# Patient Record
Sex: FEMALE | Race: WHITE | NOT HISPANIC OR LATINO | Employment: FULL TIME | URBAN - METROPOLITAN AREA
[De-identification: names, ages, dates, MRNs, and addresses within clinical notes are randomized per-mention and may not be internally consistent; named-entity substitution may affect disease eponyms.]

---

## 2023-03-13 RX ORDER — PHENTERMINE HYDROCHLORIDE 37.5 MG/1
37.5 TABLET ORAL DAILY
COMMUNITY

## 2023-03-13 RX ORDER — MULTIPLE VITAMINS W/ MINERALS TAB 9MG-400MCG
1 TAB ORAL DAILY
COMMUNITY

## 2023-03-13 RX ORDER — LEVOTHYROXINE SODIUM 150 UG/1
150 TABLET ORAL DAILY
COMMUNITY

## 2023-03-13 RX ORDER — CEPHALEXIN 500 MG/1
500 CAPSULE ORAL 3 TIMES DAILY
COMMUNITY
End: 2023-03-14

## 2023-03-13 RX ORDER — ERGOCALCIFEROL 1.25 MG/1
50000 CAPSULE, LIQUID FILLED ORAL WEEKLY
COMMUNITY

## 2023-03-14 ENCOUNTER — ANESTHESIA EVENT (OUTPATIENT)
Dept: SURGERY | Facility: CLINIC | Age: 53
End: 2023-03-14
Payer: COMMERCIAL

## 2023-03-14 RX ORDER — ACETAMINOPHEN 325 MG/1
325 TABLET ORAL DAILY PRN
COMMUNITY

## 2023-03-14 NOTE — PROGRESS NOTES
PTA medications updated by Medication Scribe prior to surgery via phone call with patient (last doses completed by Nurse)     Medication history sources: Patient and H&P  In the past week, patient estimated taking medication this percent of the time: Greater than 90%  Adherence assessment: N/A Not Observed    Significant changes made to the medication list:  None      Additional medication history information:   None    Medication reconciliation completed by provider prior to medication history? No    Time spent in this activity: 30 MINUTES    The information provided in this note is only as accurate as the sources available at the time of update(s)      Prior to Admission medications    Medication Sig Last Dose Taking? Auth Provider Long Term End Date   acetaminophen (TYLENOL) 325 MG tablet Take 325 mg by mouth daily as needed for mild pain  at PRN Yes Reported, Patient     levothyroxine (SYNTHROID/LEVOTHROID) 150 MCG tablet Take 150 mcg by mouth daily  at PM Yes Reported, Patient Yes    multivitamin w/minerals (THERA-VIT-M) tablet Take 1 tablet by mouth daily  at PM Yes Reported, Patient     OVER-THE-COUNTER Take 1 tablet by mouth daily as needed (SWELLING) PRODUCT NAME: Arnica Tablets  at PM Yes Reported, Patient     phentermine (ADIPEX-P) 37.5 MG tablet Take 37.5 mg by mouth daily  at PM Yes Reported, Patient     vitamin B-12 (CYANOCOBALAMIN) 1000 MCG tablet Take 1,000 mcg by mouth daily  Yes Reported, Patient     vitamin D2 (ERGOCALCIFEROL) 94500 units (1250 mcg) capsule Take 50,000 Units by mouth once a week on Thursday  at PM Yes Reported, Patient

## 2023-03-15 ENCOUNTER — HOSPITAL ENCOUNTER (INPATIENT)
Facility: CLINIC | Age: 53
LOS: 1 days | Discharge: HOME | End: 2023-03-16
Attending: DENTIST | Admitting: DENTIST
Payer: COMMERCIAL

## 2023-03-15 ENCOUNTER — ANESTHESIA (OUTPATIENT)
Dept: SURGERY | Facility: CLINIC | Age: 53
End: 2023-03-15
Payer: COMMERCIAL

## 2023-03-15 PROBLEM — M26.02 MAXILLARY HYPOPLASIA: Status: ACTIVE | Noted: 2023-03-15

## 2023-03-15 PROCEDURE — 250N000011 HC RX IP 250 OP 636: Performed by: NURSE ANESTHETIST, CERTIFIED REGISTERED

## 2023-03-15 PROCEDURE — 360N000077 HC SURGERY LEVEL 4, PER MIN: Performed by: DENTIST

## 2023-03-15 PROCEDURE — 250N000009 HC RX 250: Performed by: NURSE ANESTHETIST, CERTIFIED REGISTERED

## 2023-03-15 PROCEDURE — 999N000141 HC STATISTIC PRE-PROCEDURE NURSING ASSESSMENT: Performed by: DENTIST

## 2023-03-15 PROCEDURE — 250N000025 HC SEVOFLURANE, PER MIN: Performed by: DENTIST

## 2023-03-15 PROCEDURE — 250N000009 HC RX 250: Performed by: DENTIST

## 2023-03-15 PROCEDURE — C1713 ANCHOR/SCREW BN/BN,TIS/BN: HCPCS | Performed by: DENTIST

## 2023-03-15 PROCEDURE — 258N000003 HC RX IP 258 OP 636: Performed by: ANESTHESIOLOGY

## 2023-03-15 PROCEDURE — 0NSR04Z REPOSITION MAXILLA WITH INTERNAL FIXATION DEVICE, OPEN APPROACH: ICD-10-PCS | Performed by: DENTIST

## 2023-03-15 PROCEDURE — 258N000003 HC RX IP 258 OP 636: Performed by: NURSE ANESTHETIST, CERTIFIED REGISTERED

## 2023-03-15 PROCEDURE — 272N000001 HC OR GENERAL SUPPLY STERILE: Performed by: DENTIST

## 2023-03-15 PROCEDURE — 250N000011 HC RX IP 250 OP 636: Performed by: DENTIST

## 2023-03-15 PROCEDURE — 250N000011 HC RX IP 250 OP 636: Performed by: ANESTHESIOLOGY

## 2023-03-15 PROCEDURE — 250N000013 HC RX MED GY IP 250 OP 250 PS 637: Performed by: DENTIST

## 2023-03-15 PROCEDURE — 710N000009 HC RECOVERY PHASE 1, LEVEL 1, PER MIN: Performed by: DENTIST

## 2023-03-15 PROCEDURE — 258N000003 HC RX IP 258 OP 636: Performed by: DENTIST

## 2023-03-15 PROCEDURE — 250N000011 HC RX IP 250 OP 636

## 2023-03-15 PROCEDURE — 250N000009 HC RX 250

## 2023-03-15 PROCEDURE — 370N000017 HC ANESTHESIA TECHNICAL FEE, PER MIN: Performed by: DENTIST

## 2023-03-15 PROCEDURE — 120N000001 HC R&B MED SURG/OB

## 2023-03-15 DEVICE — IMP PLATE STRK ORBITAL 08H 9255758: Type: IMPLANTABLE DEVICE | Site: MAXILLA | Status: FUNCTIONAL

## 2023-03-15 DEVICE — IMPLANTABLE DEVICE: Type: IMPLANTABLE DEVICE | Status: FUNCTIONAL

## 2023-03-15 DEVICE — IMPLANTABLE DEVICE: Type: IMPLANTABLE DEVICE | Site: MAXILLA | Status: FUNCTIONAL

## 2023-03-15 DEVICE — IMP SCR STRK CROSS 1.7X4MM SELF TAP 5017004: Type: IMPLANTABLE DEVICE | Site: MAXILLA | Status: FUNCTIONAL

## 2023-03-15 RX ORDER — FENTANYL CITRATE 50 UG/ML
25 INJECTION, SOLUTION INTRAMUSCULAR; INTRAVENOUS EVERY 5 MIN PRN
Status: DISCONTINUED | OUTPATIENT
Start: 2023-03-15 | End: 2023-03-15 | Stop reason: HOSPADM

## 2023-03-15 RX ORDER — PROPOFOL 10 MG/ML
INJECTION, EMULSION INTRAVENOUS PRN
Status: DISCONTINUED | OUTPATIENT
Start: 2023-03-15 | End: 2023-03-15

## 2023-03-15 RX ORDER — PROCHLORPERAZINE MALEATE 10 MG
10 TABLET ORAL EVERY 6 HOURS PRN
Status: DISCONTINUED | OUTPATIENT
Start: 2023-03-15 | End: 2023-03-16 | Stop reason: HOSPADM

## 2023-03-15 RX ORDER — CHLORHEXIDINE GLUCONATE ORAL RINSE 1.2 MG/ML
10 SOLUTION DENTAL ONCE
Status: COMPLETED | OUTPATIENT
Start: 2023-03-15 | End: 2023-03-15

## 2023-03-15 RX ORDER — ONDANSETRON 2 MG/ML
INJECTION INTRAMUSCULAR; INTRAVENOUS PRN
Status: DISCONTINUED | OUTPATIENT
Start: 2023-03-15 | End: 2023-03-15

## 2023-03-15 RX ORDER — DEXMEDETOMIDINE HYDROCHLORIDE 4 UG/ML
INJECTION, SOLUTION INTRAVENOUS PRN
Status: DISCONTINUED | OUTPATIENT
Start: 2023-03-15 | End: 2023-03-15

## 2023-03-15 RX ORDER — LABETALOL HYDROCHLORIDE 5 MG/ML
5 INJECTION, SOLUTION INTRAVENOUS ONCE
Status: COMPLETED | OUTPATIENT
Start: 2023-03-15 | End: 2023-03-15

## 2023-03-15 RX ORDER — ONDANSETRON 4 MG/1
4 TABLET, ORALLY DISINTEGRATING ORAL EVERY 6 HOURS PRN
Status: DISCONTINUED | OUTPATIENT
Start: 2023-03-15 | End: 2023-03-16 | Stop reason: HOSPADM

## 2023-03-15 RX ORDER — LIDOCAINE 40 MG/G
CREAM TOPICAL
Status: DISCONTINUED | OUTPATIENT
Start: 2023-03-15 | End: 2023-03-16 | Stop reason: HOSPADM

## 2023-03-15 RX ORDER — HYDROMORPHONE HCL IN WATER/PF 6 MG/30 ML
0.2 PATIENT CONTROLLED ANALGESIA SYRINGE INTRAVENOUS
Status: DISCONTINUED | OUTPATIENT
Start: 2023-03-15 | End: 2023-03-16 | Stop reason: HOSPADM

## 2023-03-15 RX ORDER — CEFAZOLIN SODIUM/WATER 2 G/20 ML
2 SYRINGE (ML) INTRAVENOUS
Status: COMPLETED | OUTPATIENT
Start: 2023-03-15 | End: 2023-03-15

## 2023-03-15 RX ORDER — LIDOCAINE HYDROCHLORIDE 20 MG/ML
INJECTION, SOLUTION INFILTRATION; PERINEURAL PRN
Status: DISCONTINUED | OUTPATIENT
Start: 2023-03-15 | End: 2023-03-15

## 2023-03-15 RX ORDER — BENZOCAINE/MENTHOL 6 MG-10 MG
LOZENGE MUCOUS MEMBRANE PRN
Status: DISCONTINUED | OUTPATIENT
Start: 2023-03-15 | End: 2023-03-15 | Stop reason: HOSPADM

## 2023-03-15 RX ORDER — AMOXICILLIN 250 MG
1 CAPSULE ORAL 2 TIMES DAILY
Status: DISCONTINUED | OUTPATIENT
Start: 2023-03-15 | End: 2023-03-16 | Stop reason: HOSPADM

## 2023-03-15 RX ORDER — METHYLPREDNISOLONE SODIUM SUCCINATE 125 MG/2ML
125 INJECTION, POWDER, LYOPHILIZED, FOR SOLUTION INTRAMUSCULAR; INTRAVENOUS EVERY 6 HOURS
Status: COMPLETED | OUTPATIENT
Start: 2023-03-15 | End: 2023-03-16

## 2023-03-15 RX ORDER — ONDANSETRON 4 MG/1
4 TABLET, ORALLY DISINTEGRATING ORAL EVERY 30 MIN PRN
Status: DISCONTINUED | OUTPATIENT
Start: 2023-03-15 | End: 2023-03-15 | Stop reason: HOSPADM

## 2023-03-15 RX ORDER — PROPOFOL 10 MG/ML
INJECTION, EMULSION INTRAVENOUS CONTINUOUS PRN
Status: DISCONTINUED | OUTPATIENT
Start: 2023-03-15 | End: 2023-03-15

## 2023-03-15 RX ORDER — NALOXONE HYDROCHLORIDE 0.4 MG/ML
0.4 INJECTION, SOLUTION INTRAMUSCULAR; INTRAVENOUS; SUBCUTANEOUS
Status: DISCONTINUED | OUTPATIENT
Start: 2023-03-15 | End: 2023-03-16 | Stop reason: HOSPADM

## 2023-03-15 RX ORDER — ACETAMINOPHEN 325 MG/1
650 TABLET ORAL EVERY 4 HOURS PRN
Status: DISCONTINUED | OUTPATIENT
Start: 2023-03-18 | End: 2023-03-16 | Stop reason: HOSPADM

## 2023-03-15 RX ORDER — POLYETHYLENE GLYCOL 3350 17 G/17G
17 POWDER, FOR SOLUTION ORAL DAILY
Status: DISCONTINUED | OUTPATIENT
Start: 2023-03-16 | End: 2023-03-16 | Stop reason: HOSPADM

## 2023-03-15 RX ORDER — OXYMETAZOLINE HYDROCHLORIDE 0.05 G/100ML
SPRAY NASAL PRN
Status: DISCONTINUED | OUTPATIENT
Start: 2023-03-15 | End: 2023-03-15

## 2023-03-15 RX ORDER — FENTANYL CITRATE 50 UG/ML
50 INJECTION, SOLUTION INTRAMUSCULAR; INTRAVENOUS EVERY 5 MIN PRN
Status: DISCONTINUED | OUTPATIENT
Start: 2023-03-15 | End: 2023-03-15 | Stop reason: HOSPADM

## 2023-03-15 RX ORDER — METHYLPREDNISOLONE SODIUM SUCCINATE 125 MG/2ML
125 INJECTION, POWDER, LYOPHILIZED, FOR SOLUTION INTRAMUSCULAR; INTRAVENOUS
Status: COMPLETED | OUTPATIENT
Start: 2023-03-15 | End: 2023-03-15

## 2023-03-15 RX ORDER — HYDROMORPHONE HCL IN WATER/PF 6 MG/30 ML
0.2 PATIENT CONTROLLED ANALGESIA SYRINGE INTRAVENOUS EVERY 5 MIN PRN
Status: DISCONTINUED | OUTPATIENT
Start: 2023-03-15 | End: 2023-03-15 | Stop reason: HOSPADM

## 2023-03-15 RX ORDER — METHYLPREDNISOLONE SODIUM SUCCINATE 125 MG/2ML
125 INJECTION, POWDER, LYOPHILIZED, FOR SOLUTION INTRAMUSCULAR; INTRAVENOUS EVERY 6 HOURS
Status: DISCONTINUED | OUTPATIENT
Start: 2023-03-16 | End: 2023-03-16 | Stop reason: HOSPADM

## 2023-03-15 RX ORDER — AMPICILLIN AND SULBACTAM 1; .5 G/1; G/1
1.5 INJECTION, POWDER, FOR SOLUTION INTRAMUSCULAR; INTRAVENOUS EVERY 6 HOURS
Status: COMPLETED | OUTPATIENT
Start: 2023-03-15 | End: 2023-03-16

## 2023-03-15 RX ORDER — NALOXONE HYDROCHLORIDE 0.4 MG/ML
0.2 INJECTION, SOLUTION INTRAMUSCULAR; INTRAVENOUS; SUBCUTANEOUS
Status: DISCONTINUED | OUTPATIENT
Start: 2023-03-15 | End: 2023-03-16 | Stop reason: HOSPADM

## 2023-03-15 RX ORDER — CHLORHEXIDINE GLUCONATE ORAL RINSE 1.2 MG/ML
SOLUTION DENTAL PRN
Status: DISCONTINUED | OUTPATIENT
Start: 2023-03-15 | End: 2023-03-15 | Stop reason: HOSPADM

## 2023-03-15 RX ORDER — ONDANSETRON 2 MG/ML
4 INJECTION INTRAMUSCULAR; INTRAVENOUS EVERY 6 HOURS PRN
Status: DISCONTINUED | OUTPATIENT
Start: 2023-03-15 | End: 2023-03-16 | Stop reason: HOSPADM

## 2023-03-15 RX ORDER — AMPICILLIN AND SULBACTAM 1; .5 G/1; G/1
1.5 INJECTION, POWDER, FOR SOLUTION INTRAMUSCULAR; INTRAVENOUS EVERY 6 HOURS
Status: DISCONTINUED | OUTPATIENT
Start: 2023-03-15 | End: 2023-03-15

## 2023-03-15 RX ORDER — ACETAMINOPHEN 325 MG/1
975 TABLET ORAL EVERY 8 HOURS
Status: DISCONTINUED | OUTPATIENT
Start: 2023-03-15 | End: 2023-03-16 | Stop reason: HOSPADM

## 2023-03-15 RX ORDER — SODIUM CHLORIDE, SODIUM LACTATE, POTASSIUM CHLORIDE, CALCIUM CHLORIDE 600; 310; 30; 20 MG/100ML; MG/100ML; MG/100ML; MG/100ML
INJECTION, SOLUTION INTRAVENOUS CONTINUOUS
Status: DISCONTINUED | OUTPATIENT
Start: 2023-03-15 | End: 2023-03-15 | Stop reason: HOSPADM

## 2023-03-15 RX ORDER — MAGNESIUM HYDROXIDE 1200 MG/15ML
LIQUID ORAL PRN
Status: DISCONTINUED | OUTPATIENT
Start: 2023-03-15 | End: 2023-03-15 | Stop reason: HOSPADM

## 2023-03-15 RX ORDER — HYDROMORPHONE HCL IN WATER/PF 6 MG/30 ML
0.4 PATIENT CONTROLLED ANALGESIA SYRINGE INTRAVENOUS
Status: DISCONTINUED | OUTPATIENT
Start: 2023-03-15 | End: 2023-03-16 | Stop reason: HOSPADM

## 2023-03-15 RX ORDER — CEFAZOLIN SODIUM/WATER 2 G/20 ML
2 SYRINGE (ML) INTRAVENOUS SEE ADMIN INSTRUCTIONS
Status: DISCONTINUED | OUTPATIENT
Start: 2023-03-15 | End: 2023-03-15 | Stop reason: HOSPADM

## 2023-03-15 RX ORDER — HYDROMORPHONE HCL IN WATER/PF 6 MG/30 ML
0.4 PATIENT CONTROLLED ANALGESIA SYRINGE INTRAVENOUS EVERY 5 MIN PRN
Status: DISCONTINUED | OUTPATIENT
Start: 2023-03-15 | End: 2023-03-15 | Stop reason: HOSPADM

## 2023-03-15 RX ORDER — FENTANYL CITRATE 50 UG/ML
INJECTION, SOLUTION INTRAMUSCULAR; INTRAVENOUS PRN
Status: DISCONTINUED | OUTPATIENT
Start: 2023-03-15 | End: 2023-03-15

## 2023-03-15 RX ORDER — LIDOCAINE 40 MG/G
CREAM TOPICAL
Status: DISCONTINUED | OUTPATIENT
Start: 2023-03-15 | End: 2023-03-15 | Stop reason: HOSPADM

## 2023-03-15 RX ORDER — SODIUM CHLORIDE 9 MG/ML
INJECTION, SOLUTION INTRAVENOUS CONTINUOUS
Status: DISCONTINUED | OUTPATIENT
Start: 2023-03-15 | End: 2023-03-16 | Stop reason: HOSPADM

## 2023-03-15 RX ORDER — BISACODYL 10 MG
10 SUPPOSITORY, RECTAL RECTAL DAILY PRN
Status: DISCONTINUED | OUTPATIENT
Start: 2023-03-15 | End: 2023-03-16 | Stop reason: HOSPADM

## 2023-03-15 RX ORDER — ONDANSETRON 2 MG/ML
4 INJECTION INTRAMUSCULAR; INTRAVENOUS EVERY 30 MIN PRN
Status: DISCONTINUED | OUTPATIENT
Start: 2023-03-15 | End: 2023-03-15 | Stop reason: HOSPADM

## 2023-03-15 RX ADMIN — PHENYLEPHRINE HYDROCHLORIDE 50 MCG: 10 INJECTION INTRAVENOUS at 14:44

## 2023-03-15 RX ADMIN — PROPOFOL 30 MCG/KG/MIN: 10 INJECTION, EMULSION INTRAVENOUS at 13:27

## 2023-03-15 RX ADMIN — FENTANYL CITRATE 25 MCG: 50 INJECTION, SOLUTION INTRAMUSCULAR; INTRAVENOUS at 16:27

## 2023-03-15 RX ADMIN — HYDROMORPHONE HYDROCHLORIDE 0.5 MG: 1 INJECTION, SOLUTION INTRAMUSCULAR; INTRAVENOUS; SUBCUTANEOUS at 16:02

## 2023-03-15 RX ADMIN — PROPOFOL 200 MG: 10 INJECTION, EMULSION INTRAVENOUS at 13:19

## 2023-03-15 RX ADMIN — REMIFENTANIL HYDROCHLORIDE 0.1 MCG/KG/MIN: 1 INJECTION, POWDER, LYOPHILIZED, FOR SOLUTION INTRAVENOUS at 13:27

## 2023-03-15 RX ADMIN — HYDROMORPHONE HYDROCHLORIDE 0.4 MG: 0.2 INJECTION, SOLUTION INTRAMUSCULAR; INTRAVENOUS; SUBCUTANEOUS at 17:07

## 2023-03-15 RX ADMIN — LABETALOL HYDROCHLORIDE 5 MG: 5 INJECTION INTRAVENOUS at 16:25

## 2023-03-15 RX ADMIN — PHENYLEPHRINE HYDROCHLORIDE 50 MCG: 10 INJECTION INTRAVENOUS at 14:55

## 2023-03-15 RX ADMIN — HYDROMORPHONE HYDROCHLORIDE 0.4 MG: 0.2 INJECTION, SOLUTION INTRAMUSCULAR; INTRAVENOUS; SUBCUTANEOUS at 16:58

## 2023-03-15 RX ADMIN — HYDROMORPHONE HYDROCHLORIDE 0.4 MG: 0.2 INJECTION, SOLUTION INTRAMUSCULAR; INTRAVENOUS; SUBCUTANEOUS at 16:33

## 2023-03-15 RX ADMIN — HYDROMORPHONE HYDROCHLORIDE 0.4 MG: 0.2 INJECTION, SOLUTION INTRAMUSCULAR; INTRAVENOUS; SUBCUTANEOUS at 16:45

## 2023-03-15 RX ADMIN — METHYLPREDNISOLONE 125 MG: 125 INJECTION, POWDER, LYOPHILIZED, FOR SOLUTION INTRAMUSCULAR; INTRAVENOUS at 13:49

## 2023-03-15 RX ADMIN — PHENYLEPHRINE HYDROCHLORIDE 50 MCG: 10 INJECTION INTRAVENOUS at 15:04

## 2023-03-15 RX ADMIN — SODIUM CHLORIDE, POTASSIUM CHLORIDE, SODIUM LACTATE AND CALCIUM CHLORIDE: 600; 310; 30; 20 INJECTION, SOLUTION INTRAVENOUS at 15:57

## 2023-03-15 RX ADMIN — ROCURONIUM BROMIDE 50 MG: 50 INJECTION, SOLUTION INTRAVENOUS at 13:19

## 2023-03-15 RX ADMIN — REMIFENTANIL HYDROCHLORIDE 0.15 MCG/KG/MIN: 1 INJECTION, POWDER, LYOPHILIZED, FOR SOLUTION INTRAVENOUS at 13:55

## 2023-03-15 RX ADMIN — AMPICILLIN SODIUM AND SULBACTAM SODIUM 1.5 G: 1; .5 INJECTION, POWDER, FOR SOLUTION INTRAMUSCULAR; INTRAVENOUS at 23:35

## 2023-03-15 RX ADMIN — ACETAMINOPHEN 975 MG: 325 TABLET, FILM COATED ORAL at 20:51

## 2023-03-15 RX ADMIN — ONDANSETRON 4 MG: 2 INJECTION INTRAMUSCULAR; INTRAVENOUS at 17:34

## 2023-03-15 RX ADMIN — MIDAZOLAM 2 MG: 1 INJECTION INTRAMUSCULAR; INTRAVENOUS at 13:14

## 2023-03-15 RX ADMIN — PHENYLEPHRINE HYDROCHLORIDE 100 MCG: 10 INJECTION INTRAVENOUS at 15:11

## 2023-03-15 RX ADMIN — SODIUM CHLORIDE, POTASSIUM CHLORIDE, SODIUM LACTATE AND CALCIUM CHLORIDE: 600; 310; 30; 20 INJECTION, SOLUTION INTRAVENOUS at 13:14

## 2023-03-15 RX ADMIN — OXYMETAZOLINE HYDROCHLORIDE 2 SPRAY: 0.05 SPRAY NASAL at 13:18

## 2023-03-15 RX ADMIN — Medication 3 G: at 13:22

## 2023-03-15 RX ADMIN — SUGAMMADEX 200 MG: 100 INJECTION, SOLUTION INTRAVENOUS at 16:02

## 2023-03-15 RX ADMIN — ONDANSETRON 4 MG: 2 INJECTION INTRAMUSCULAR; INTRAVENOUS at 15:57

## 2023-03-15 RX ADMIN — FENTANYL CITRATE 100 MCG: 50 INJECTION, SOLUTION INTRAMUSCULAR; INTRAVENOUS at 13:19

## 2023-03-15 RX ADMIN — DEXMEDETOMIDINE HYDROCHLORIDE 20 MCG: 200 INJECTION INTRAVENOUS at 13:19

## 2023-03-15 RX ADMIN — FENTANYL CITRATE 25 MCG: 50 INJECTION, SOLUTION INTRAMUSCULAR; INTRAVENOUS at 16:22

## 2023-03-15 RX ADMIN — SODIUM CHLORIDE: 9 INJECTION, SOLUTION INTRAVENOUS at 20:21

## 2023-03-15 RX ADMIN — METHYLPREDNISOLONE SODIUM SUCCINATE 125 MG: 125 INJECTION, POWDER, FOR SOLUTION INTRAMUSCULAR; INTRAVENOUS at 20:51

## 2023-03-15 RX ADMIN — LIDOCAINE HYDROCHLORIDE 80 MG: 20 INJECTION, SOLUTION INFILTRATION; PERINEURAL at 13:19

## 2023-03-15 RX ADMIN — HYDROMORPHONE HYDROCHLORIDE 0.4 MG: 0.2 INJECTION, SOLUTION INTRAMUSCULAR; INTRAVENOUS; SUBCUTANEOUS at 20:17

## 2023-03-15 RX ADMIN — CHLORHEXIDINE GLUCONATE 0.12% ORAL RINSE 10 ML: 1.2 LIQUID ORAL at 11:37

## 2023-03-15 RX ADMIN — PHENYLEPHRINE HYDROCHLORIDE 50 MCG: 10 INJECTION INTRAVENOUS at 14:52

## 2023-03-15 RX ADMIN — PHENYLEPHRINE HYDROCHLORIDE 100 MCG: 10 INJECTION INTRAVENOUS at 15:16

## 2023-03-15 RX ADMIN — ONDANSETRON 4 MG: 2 INJECTION INTRAMUSCULAR; INTRAVENOUS at 19:57

## 2023-03-15 ASSESSMENT — ACTIVITIES OF DAILY LIVING (ADL)
ADLS_ACUITY_SCORE: 20
ADLS_ACUITY_SCORE: 33
ADLS_ACUITY_SCORE: 20

## 2023-03-15 ASSESSMENT — LIFESTYLE VARIABLES: TOBACCO_USE: 0

## 2023-03-15 ASSESSMENT — ENCOUNTER SYMPTOMS: SEIZURES: 0

## 2023-03-15 NOTE — OP NOTE
Procedure Date: 03/15/2023    PREOPERATIVE DIAGNOSIS:  Maxillary hypoplasia.    POSTOPERATIVE DIAGNOSES:  Maxillary hypoplasia.    PROCEDURE PERFORMED:  Segmental [place] osteotomy.    SURGEON:  Audi Baca MD     ANESTHESIA:  General anesthetic with nasotracheal intubation.    ASSISTANT SURGEON:  Dr. Km Alves.      ESTIMATED BLOOD LOSS FOR THE CASE:  100 mL.     REPLACEMENT:  Crystalloid.    HISTORY OF PRESENT ILLNESS:  The patient is a 52-year-old woman well known to our office, whose care is coordinated through her orthodontist, Dr. Nicho Hidalgo.  He has performed leveling and aligning of the dentition with full orthodontic appliances in preparation for a 2-piece maxilla or segmental maxillary osteotomy.  She has a longstanding history of class 3 malocclusion with maxillary hypoplasia.  The maxillary hypoplasia is most severe in the transverse dimension.  Physical examination reveals full orthodontic appliances with good hygiene.  She demonstrates bilateral crossbite.  She has end to end occlusal relationship.  Virtual surgical planning was performed to treatment plan and fabricate acrylic CAD/CAM splints.  These were tried in preoperatively and noted to fit well with the mandibular dentition.  Her past medical history is significant for prior gastric bypass as well as reduction mammoplasty.  She has a history of hypothyroidism for which she takes levothyroxine supplement.  The risks and complications of segmental [place] osteotomy were discussed at length with the patient on multiple occasions.  As well, her son recently underwent a similar surgery and therefore she is quite fluent in the anatomy, postoperative cares, risks, et cetera.  Potential for postoperative relapse pain, swelling, infection, bleeding sinusitis, nasal septal deviation, et cetera has been reviewed.  Postoperative cares including no chew diet x 2 months, wired splint to the teeth requiring extra cleaning precautions has all been  reviewed.  All questions answered.  Consent is signed.    DESCRIPTION OF PROCEDURE:  The patient was taken to the main OR, placed supine on the operating room table and after smooth induction of general anesthetic, nasotracheal intubation was achieved.  The tube was secured by the anesthesia service.  The patient was prepped and draped in the usual fashion for the procedure.  Moistened throat pack was placed.  10 mL of 2% lidocaine was utilized to infiltrate the maxillary vestibule.  A 15 blade and electrocautery were utilized to create a [place] I incision through mucosa and submucosa.  The dissection was carried through periosteum to expose and skeletonize the anterior maxilla.  The dissection was carried posteriorly to the pterygomaxillary fissures bilaterally.  The dissection was carried into the floor of the nose where the anterior nasal spine is exposed and the nasal mucosa is elevated from the lateral as well as the nasal floor.  A sagittal saw was utilized to create a [place] osteotomy from the right and left piriform rims to the pterygomaxillary fissure bilaterally.  A wax spatula osteotome was utilized to osteotomize the lateral nasal walls.  A nasal septal osteotome was utilized to osteotomize the nasal septum and vomer bone.  Pterygomaxillary dysjunction is created utilizing a curved chisel and mallet.  Digital palpation was utilized during the course of this separation.  A 24-gauge stainless steel wire was passed through the anterior nasal spine utilizing a wire passing bur.  This was rosetted and utilized to down fracture the maxilla in an uncomplicated fashion.  The descending neurovascular bundle was identified, retracted bilaterally.  It was cauterized bilaterally.  Sequential reduction of the posterior maxillary interferences is completed utilizing a long guard barrel bur and irrigation.  Sequential reduction was completed in a meticulous fashion.  Cauterization of the nasal and incisive canal  contents is performed.  Good hemostasis was noted.  Attention is turned to segmentalization, which is initiated with a 700 campos creating cortical perforations between teeth numbers 8 and 9 and carried to the right of the anterior nasal spine.  Rotary instrumentation was then utilized to create and complete the osteotomy through the incisive canal.  Copious irrigation was utilized for this process.  A long guard round bur was then utilized to create a Y-shaped osteotomy through the hard palate from the nasal aspect.  A Turvey distractor was utilized to facilitate increasing of the transverse dimension of the maxilla.  The patient is wired into the prefabricated occlusal splint, which is noted to fit quite well.  The maxilla is wired into the splint utilizing 26-gauge stainless steel wires.  The orthodontic wire is cut in the midline.  A single 26-gauge wire was utilized to approximate the midline by ligating the orthodontic appliances on teeth 8 and 9.  The maxilla was noted to be in a segmental state and to fit passively in the prefabricated splint.  The patient was placed into maxillomandibular fixation utilizing 24 and 26-gauge stainless steel wires.  The maxillary mandibular complex was rotated superiorly and sequential reduction of interferences was completed.  The maxilla was noted to fit quite nicely with adaptation of the piriform rims and maxillary buttresses bilaterally.  Rigid fixation was then utilized to stabilize the segment to the osteotomized maxilla.  [place] 2 mm mini plates are utilized in the piriform rim region as well as the buttress region bilaterally.  A 1.7 mm diameter screws were utilized to stabilize the mini plates.  Excellent stability was achieved.  The maxillomandibular fixation is released and the patient's occlusion was noted to rotate into and out of maximum intercuspation.  After achieving excellent stability, irrigation is applied to the surgical site and attention was turned to  closure of the incision.  This is completed utilizing running 4-0 undyed Vicryl suture.  Tension-free watertight closure was established.  The patient's septum and vomer were noted to be straight and un-impinged.  The previously placed moistened throat pack was removed and the posterior oropharynx is suctioned free and clear secretions.  Orogastric tube is passed for the anesthesia service.  Contents are evacuated.  The patient was placed into light 3/16 medium elastic vertical pull traction in the canine regions bilaterally.  The patient was then returned to the hands of anesthesia for extubation in the main OR following suitable recovery from the general anesthetic.  All sponge and needle counts were correct x 2.    Audi Baca DDS        D: 03/15/2023   T: 03/15/2023   MT: JAY    Name:     TENA SMITHDerek  MRN:      [id number]        Account:        [id number]   :      1970           Procedure Date: 03/15/2023     Document: [id number]

## 2023-03-15 NOTE — ANESTHESIA PREPROCEDURE EVALUATION
Anesthesia Pre-Procedure Evaluation    Patient: Ramona Monroy   MRN: [id number] : 1970        Procedure : Procedure(s):  SEGMENTAL [place] I OSTEOTOMY          Past Medical History:   Diagnosis Date     Epidermolysis bullosa simplex      Hypercholesteremia      Hypertension      Hypothyroidism      Malocclusion, Angle's class III      Maxillary hypoplasia      Morbid obesity (H)      Nocturia      PCOS (polycystic ovarian syndrome)      Sleep apnea      Vitamin D deficiency       Past Surgical History:   Procedure Laterality Date     ARTHROSCOPY KNEE      meniscus repair x 2 (, )      SECTION      x2     CYSTOURETHROSCOPY  2013     LAPAROSCOPIC SINGLE SITE TOTAL HYSTERECTOMY       ZZC SYNVISC-ONE INJECTION Left     knee      Allergies   Allergen Reactions     Ethinyl Estradiol      Other reaction(s): Emotional Disturbance  Has happened with multiple oral contraceptives     Other (Do Not Use)      Other reaction(s): Unknown Reaction  OCP  Intolerance (birth control pills)      Social History     Tobacco Use     Smoking status: Former     Types: Cigarettes     Start date:      Quit date:      Years since quitting: 15.2     Smokeless tobacco: Never   Substance Use Topics     Alcohol use: Yes      Wt Readings from Last 1 Encounters:   03/15/23 129.7 kg (286 lb)        Anesthesia Evaluation   Pt has had prior anesthetic.     No history of anesthetic complications       ROS/MED HX  ENT/Pulmonary:     (+) sleep apnea, doesn't use CPAP,  (-) tobacco use   Neurologic:    (-) no seizures and no CVA   Cardiovascular:     (+) Dyslipidemia hypertension-----    METS/Exercise Tolerance:     Hematologic:       Musculoskeletal:       GI/Hepatic:    (-) GERD and liver disease   Renal/Genitourinary:    (-) renal disease   Endo: Comment: PCOS    (+) thyroid problem, hypothyroidism, Obesity,  (-) Type II DM   Psychiatric/Substance Use:       Infectious Disease:       Malignancy:       Other:             Physical Exam    Airway        Mallampati: III   TM distance: > 3 FB   Neck ROM: full   Mouth opening: > 3 cm    Respiratory Devices and Support         Dental       (+) Modest Abnormalities - crowns, retainers, 1 or 2 missing teeth      Cardiovascular   cardiovascular exam normal          Pulmonary   pulmonary exam normal                OUTSIDE LABS:  CBC: No results found for: WBC, HGB, HCT, PLT  BMP: No results found for: NA, POTASSIUM, CHLORIDE, CO2, BUN, CR, GLC  COAGS: No results found for: PTT, INR, FIBR  POC: No results found for: BGM, HCG, HCGS  HEPATIC: No results found for: ALBUMIN, PROTTOTAL, ALT, AST, GGT, ALKPHOS, BILITOTAL, BILIDIRECT, LEXIS  OTHER: No results found for: PH, LACT, A1C, ROSHAN, PHOS, MAG, LIPASE, AMYLASE, TSH, T4, T3, CRP, SED    Anesthesia Plan    ASA Status:  3   NPO Status:  NPO Appropriate    Anesthesia Type: General.     - Airway: ETT   Induction: Intravenous.   Maintenance: Balanced.   Techniques and Equipment:     - Airway: Video-Laryngoscope, Nasal JONY         Consents    Anesthesia Plan(s) and associated risks, benefits, and realistic alternatives discussed. Questions answered and patient/representative(s) expressed understanding.    - Discussed:     - Discussed with:  Patient, Spouse         Postoperative Care    Pain management: Multi-modal analgesia.   PONV prophylaxis: Ondansetron (or other 5HT-3), Dexamethasone or Solumedrol, Background Propofol Infusion     Comments:                Maxx Kumar MD

## 2023-03-15 NOTE — PROVIDER NOTIFICATION
Dr London aware of /107.  HR: 85.  Verbal order for 5mg Labetalol and to start narcotics for pain control

## 2023-03-15 NOTE — INTERVAL H&P NOTE
"I have reviewed the surgical (or preoperative) H&P that is linked to this encounter, and examined the patient. There are no significant changes    Clinical Conditions Present on Arrival:  Clinically Significant Risk Factors Present on Admission                    # Severe Obesity: Estimated body mass index is 44.79 kg/m  as calculated from the following:    Height as of this encounter: 1.702 m (5' 7\").    Weight as of this encounter: 129.7 kg (286 lb).       "

## 2023-03-15 NOTE — ANESTHESIA POSTPROCEDURE EVALUATION
Patient: Ramona Monroy    Procedure: Procedure(s):  SEGMENTAL [place] I OSTEOTOMY       Anesthesia Type:  General    Note:  Disposition: Admission   Postop Pain Control: Uneventful            Sign Out: Well controlled pain   PONV: No   Neuro/Psych: Uneventful            Sign Out: Acceptable/Baseline neuro status   Airway/Respiratory: Uneventful            Sign Out: Acceptable/Baseline resp. status   CV/Hemodynamics: Uneventful            Sign Out: Acceptable CV status; No obvious hypovolemia; No obvious fluid overload   Other NRE: NONE   DID A NON-ROUTINE EVENT OCCUR? No           Last vitals:  Vitals Value Taken Time   /92 03/15/23 1720   Temp 37.1  C (98.7  F) 03/15/23 1650   Pulse 90 03/15/23 1724   Resp 17 03/15/23 1724   SpO2 92 % 03/15/23 1724   Vitals shown include unvalidated device data.    Electronically Signed By: Nicol London MD  March 15, 2023  5:26 PM

## 2023-03-15 NOTE — BRIEF OP NOTE
[facility]    Brief Operative Note    Pre-operative diagnosis: Maxillary hypoplasia [M26.02]  Malocclusion, Angle's class III [M26.213]  Post-operative diagnosis Same as pre-operative diagnosis    Procedure: Procedure(s):  SEGMENTAL [place] I OSTEOTOMY  Surgeon: Surgeon(s) and Role:     * Audi Baca DDS - Primary     * Moi Alves DDS  Anesthesia: General   Estimated Blood Loss: Less than 100 ml    Drains: None  Specimens: * No specimens in log *  Findings:   None.  Complications: None.  Implants:   Implant Name Type Inv. Item Serial No.  Lot No. LRB No. Used Action   IMP SCR STRK CROSS 1.7X3MM SELF TAP [id number] - [id number] Metallic Hardware/Sandwich IMP SCR STRK CROSS 1.7X3MM SELF TAP [id number]  [employer]  Left 1 Implanted   IMP SCR STRK CROSS 1.7X4MM SELF TAP [id number] - [id number] Metallic Hardware/Sandwich IMP SCR STRK CROSS 1.7X4MM SELF TAP [id number]  [employer]  Left 2 Implanted   IMP SCR STRK CROSS 1.7X4MM SELF TAP [id number] - [id number] Metallic Hardware/Sandwich IMP SCR STRK CROSS 1.7X4MM SELF TAP [id number]  [employer]  Right 2 Implanted   IMP PLATE STRK L SHAPE 05H ADV LOCKING 02MM [id number] - [id number] Metallic Hardware/Sandwich IMP PLATE STRK L SHAPE 05H ADV LOCKING 02MM [id number]  [employer]  Left 1 Implanted   IMP PLATE STRK L SHAPE 05H ADV LOCKING 02MM [id number] - [id number] Metallic Hardware/Sandwich IMP PLATE STRK L SHAPE 05H ADV LOCKING 02MM [id number]  [employer]  Right 1 Implanted   IMP PLATE STRK ORBITAL 08H [id number] - [id number] Metallic Hardware/Sandwich IMP PLATE STRK ORBITAL 08H [id number]  [employer]  N/A 1 Implanted   SELF - DRILLING SCREWS SILVER 1.7MM X 4MM    DANIEL  Right 7 Implanted   SELF DRILLING SCREWS SILVER 1.7MM X 4MM    DANIEL  Left 4 Implanted

## 2023-03-15 NOTE — ANESTHESIA PROCEDURE NOTES
Airway         Procedure Start/Stop Times: 3/15/2023 1:24 PM  Staff -        Anesthesiologist:  Maxx Kumar MD       CRNA: Cheryl Walker APRN CRNA       Other Anesthesia Staff: Danyelle Sidhu       Performed By: SRNAIndications and Patient Condition       Indications for airway management: jessica-procedural       Induction type:intravenous       Mask difficulty assessment: 2 - vent by mask + OA or adjuvant +/- NMBA    Final Airway Details       Final airway type: endotracheal airway       Successful airway: Nasal and JONY  Endotracheal Airway Details        ETT size (mm): 7.0       Cuffed: yes       Cuff volume (mL): 8       Successful intubation technique: video laryngoscopy       VL Blade Size: Glidescope 3       Grade View of Cords: 1       Position: Other (comment)       Measured from: nares (bend at nares)       Bite block used: None    Post intubation assessment        Placement verified by: capnometry, equal breath sounds and chest rise        Number of attempts at approach: 1       Secured with: pink tape       Ease of procedure: easy       Dentition: Intact and Unchanged    Medication(s) Administered   Medication Administration Time: 3/15/2023 1:24 PM    Additional Comments       Nasal ETT secured with taped following intubation. Secured further with tape and sponge with guidance from oral surgeon.

## 2023-03-15 NOTE — ANESTHESIA CARE TRANSFER NOTE
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    Anesthesia Type:   General     Note:    Oropharynx: oropharynx clear of all foreign objects and spontaneously breathing  Level of Consciousness: drowsy  Oxygen Supplementation: face mask  Level of Supplemental Oxygen (L/min / FiO2): 6  Independent Airway: airway patency satisfactory and stable  Dentition: dentition unchanged  Vital Signs Stable: post-procedure vital signs reviewed and stable  Report to RN Given: handoff report given  Patient transferred to: PACU    Handoff Report: Identifed the Patient, Identified the Reponsible Provider, Reviewed the pertinent medical history, Discussed the surgical course, Reviewed Intra-OP anesthesia mangement and issues during anesthesia, Set expectations for post-procedure period and Allowed opportunity for questions and acknowledgement of understanding      Vitals:  Vitals Value Taken Time   /106 03/15/23 1616   Temp     Pulse 95 03/15/23 1617   Resp 19 03/15/23 1617   SpO2 98 % 03/15/23 1617   Vitals shown include unvalidated device data.    Electronically Signed By: LUCIUS Krueger CRNA  March 15, 2023  4:19 PM

## 2023-03-16 VITALS
OXYGEN SATURATION: 93 % | HEIGHT: 67 IN | HEART RATE: 105 BPM | WEIGHT: 286 LBS | BODY MASS INDEX: 44.89 KG/M2 | TEMPERATURE: 97.9 F | RESPIRATION RATE: 16 BRPM | DIASTOLIC BLOOD PRESSURE: 97 MMHG | SYSTOLIC BLOOD PRESSURE: 140 MMHG

## 2023-03-16 PROCEDURE — 250N000013 HC RX MED GY IP 250 OP 250 PS 637: Performed by: DENTIST

## 2023-03-16 PROCEDURE — 250N000011 HC RX IP 250 OP 636: Performed by: DENTIST

## 2023-03-16 RX ORDER — IBUPROFEN 600 MG/1
600 TABLET, FILM COATED ORAL EVERY 6 HOURS PRN
Status: DISCONTINUED | OUTPATIENT
Start: 2023-03-16 | End: 2023-03-16 | Stop reason: HOSPADM

## 2023-03-16 RX ORDER — HYDROCODONE BITARTRATE AND ACETAMINOPHEN 5; 325 MG/1; MG/1
1 TABLET ORAL EVERY 6 HOURS PRN
Status: DISCONTINUED | OUTPATIENT
Start: 2023-03-16 | End: 2023-03-16 | Stop reason: HOSPADM

## 2023-03-16 RX ADMIN — ACETAMINOPHEN 975 MG: 325 TABLET, FILM COATED ORAL at 14:40

## 2023-03-16 RX ADMIN — HYDROCODONE BITARTRATE AND ACETAMINOPHEN 1 TABLET: 5; 325 TABLET ORAL at 10:20

## 2023-03-16 RX ADMIN — ACETAMINOPHEN 975 MG: 325 TABLET, FILM COATED ORAL at 03:43

## 2023-03-16 RX ADMIN — AMPICILLIN SODIUM AND SULBACTAM SODIUM 1.5 G: 1; .5 INJECTION, POWDER, FOR SOLUTION INTRAMUSCULAR; INTRAVENOUS at 11:14

## 2023-03-16 RX ADMIN — AMPICILLIN SODIUM AND SULBACTAM SODIUM 1.5 G: 1; .5 INJECTION, POWDER, FOR SOLUTION INTRAMUSCULAR; INTRAVENOUS at 06:19

## 2023-03-16 RX ADMIN — METHYLPREDNISOLONE SODIUM SUCCINATE 125 MG: 125 INJECTION, POWDER, FOR SOLUTION INTRAMUSCULAR; INTRAVENOUS at 09:00

## 2023-03-16 RX ADMIN — PROCHLORPERAZINE EDISYLATE 10 MG: 5 INJECTION INTRAMUSCULAR; INTRAVENOUS at 00:00

## 2023-03-16 RX ADMIN — METHYLPREDNISOLONE SODIUM SUCCINATE 125 MG: 125 INJECTION, POWDER, FOR SOLUTION INTRAMUSCULAR; INTRAVENOUS at 03:43

## 2023-03-16 ASSESSMENT — ACTIVITIES OF DAILY LIVING (ADL)
ADLS_ACUITY_SCORE: 20

## 2023-03-16 NOTE — CONSULTS
NUTRITION EDUCATION    REASON FOR ASSESSMENT:  Nutrition education on Jaw Surgery Diet    CURRENT DIET:  Full Liquid Jaw Surgery Diet    Visited with pt and  this morning  Pt notes that her son had this surgery back in December, and so she is very familiar with the post-op diet guidelines  Currently is having some nausea  Has taken some broth and sherbet - using a spoon        NUTRITION DIAGNOSIS:  No nutrition diagnosis at this time    INTERVENTIONS:    Nutrition Prescription:  Recommended adequate calories and protein to promote healing, several small meals per day, and use of high protein oral supplements.    Implementation:    Assessed learning needs, learning preferences, and willingness to learn    Nutrition Education  (Content):  a) Provided handout  What to Eat After Jaw Surgery     Medical Food Supplements - recommended use of a high protein nutritional supplement    Anticipate good compliance    Diet Education - refer to Education Flowsheet      Gregoria Naik RD, LD  Clinical Dietitian - [facility]   Pager - [phone]

## 2023-03-16 NOTE — PROVIDER NOTIFICATION
MD Notification    Notified Person: MD    Notified Person Name: Veronica    Notification Date/Time: 3/15/23 2157    Notification Interaction: vocera    Purpose of Notification: pharmacy called me asking about the pts Methyprednisolone. There are 2 orders linked and appear to be exactly the same. Is is possible that the order is meant to gibran? thanks     Orders Received:    Comments:

## 2023-03-16 NOTE — DISCHARGE INSTRUCTIONS
Jaw (Orthognathic) Surgery: Recovering at Home  You just had orthognathic surgery. This is treatment that reshapes the jaws to improve their form and function. Your job now is to keep yourself comfortable and help your body heal quickly. Make sure to get plenty of calories and protein. Get up and move around, but avoid strenuous activity. Be sure to get lots of rest. Keep your mouth and teeth clean to help the cuts (incisions) heal.  Controlling swelling and pain  For the first few days, swelling will likely increase. It should then start to ease. To reduce swelling and pain:  Sit or lie down with your head and shoulders higher than your heart.  Apply an ice pack to your face for 10 minutes at a time, with breaks of at least 5 minutes in between. To make an ice pack, put ice cubes in a plastic bag that seals at the top. Wrap the bag in a clean, thin towel or cloth. Never put an ice pack directly on your skin.  Use pain medicines as directed.  Nutrition and fluids  You will need to get enough nutrition, which may be harder while you re not able to chew. You also need fluids to help prevent dehydration and nausea. For calories, protein, and fluids, try total-nutrition drinks, protein powders, soups, milk shakes, and other blended foods. Don t use a straw, since the suction can stress the incisions in your mouth. Instead, use a glass or a sippy cup designed for young children. Once you can chew again, eat soft foods that can be cut with a spoon or fork. As you heal, you ll slowly return to your normal eating habits.  Keeping your mouth and teeth clean  To keep your teeth as clean as possible:  If you can t open your jaws, brush the front surfaces of your teeth with a baby toothbrush.  If a fluoride toothpaste or mouth rinse is prescribed, use it as directed.  If your surgeon advises it, start using an oral  about 10 days after surgery.  Aim to get back to brushing and flossing normally as soon as you  can.  When to call your surgeon  If you have any of the following problems, call your surgeon:  Severe bleeding  Pain that can t be controlled  Nausea or vomiting that can t be controlled  Swelling that keeps getting worse after 3 to 4 days  Trouble with breathing or swallowing  A fever of 100.4 F  (38 C) or higher  Go to the emergency department or call 911 if your healthcare provider s office is closed.  [employer] last reviewed this educational content on 2/1/2018 2000-2021 [employer]. All rights reserved. This information is not intended as a substitute for professional medical care. Always follow your healthcare professional's instructions.

## 2023-03-16 NOTE — PLAN OF CARE
Goal Outcome Evaluation:      Plan of Care Reviewed With: patient, spouse    Overall Patient Progress: improvingOverall Patient Progress: improving       Pt is AOx4, VSS on RA. POD#1. Up & ambulated in the hallway with Ind. Facial swelling +2 bilaterally. Pain managed with hydroxy & tylenol, and ice applied. Full liquid tolerating. Pt discharge today in the afternoon.

## 2023-03-16 NOTE — PLAN OF CARE
Summary: [id number] 3/15/23 segmental [place] osteotomy   Orientation: A/Ox4    Activity Level: Ax1 to stand by GB  Fall Risk: yes  Behavior & Aggression Tool Color: green  Pain Management: PRN Dilaudid given   ABNL VS/O2: VSS on RA  ABNL Lab/BG: n/a  Diet: full liquid diet jaw surgery  Bowel/Bladder: continent of bladder  Drains/Devices: L PIV infusing 75 mL/hr NaCl  Tests/Procedures for next shift: n/a  Anticipated DC date: TBD  Other Important Info:

## 2023-03-16 NOTE — PLAN OF CARE
Pt is AOx4, pain is managed with current regimen, tolerating full liquid but some nausea, relieved with zofran and compazine, facial swelling +2, small nose bleed at times, suction prn per pt, VSS on face tent with 28% FiO2, voiding, SBA, progressing per plan of care.

## 2023-03-16 NOTE — UTILIZATION REVIEW
Admission Status; Secondary Review Determination       Under the authority of the Utilization Management Committee, the utilization review process indicated a secondary review on the above patient. The review outcome is based on review of the medical records, discussions with staff, and applying clinical experience noted on the date of the review.     (x) Outpatient Status with extended recovery is appropriate - This patient does not meet hospital inpatient criteria. If this patient's primary payer is Medicare and was admitted as an inpatient, Condition Code 44 should be used and patient status changed to outpatient recovery.    RATIONALE FOR DETERMINATION   52 years old wo man who underwent a segmental LE FORT I osteotomy on March 15. Patient was admitted to the hospital after the procedure. The procedure is not on the CMS inpatient list. No documented complications or unexpected recovery. Patient can be safely  monitored for bleeding and recover in outpatient/extended recovery setting. The severity of illness, intensity of service provided, expected LOS and risk for adverse outcome doesn't meet inpatient hospital admission. Dr. Audi Baca and dr Fonseca Office ([phone])  notified.    This document was produced using voice recognition software.     The information on this document is developed by the utilization review team in order for the business office to ensure compliance. This only denotes the appropriateness of proper admission status and does not reflect the quality of care rendered.   The definitions of Inpatient Status and Observation Status used in making the determination above are those provided in the CMS Coverage Manual, Chapter 1 and Chapter 6, section 70.4.   Sincerely,   KARLEE GALAN MD   Utilization Review  Physician Advisor  [facility]

## 2023-03-19 NOTE — DISCHARGE SUMMARY
[facility] INPATIENT DISCHARGE SUMMARY    DATE OF ADMISSION: 3/15/23    DATE OF DISCHARGE: 3/16/23    ADMISSION DIAGNOSES:   1.  Maxillary Hyoplasia    DISCHARGE DIAGNOSES:   1.  Maxillary Hyoplasia    PROCEDURES PERFORMED DURING HOSPITALIZATION:   1.  Segmental LeFort I Osteotomy    SURGEON:    Dr. Audi Baca    SURGEON ASSISTANT:    Dr. Km Alves    HOSPITALIZATION COURSE:  Ramona is a 52 year-old woman presenting with the abovementioned diagnoses.  The patient has been followed closely by the [facility].  After clinical evaluation, evaluation of models and imaging it was recommended that the patient undergo segmental LeFort I osteotomy to help correct her skeletal and dental malocclusion.  After discussion of potential risks and benefits associated with the above-mentioned procedures, the patient consented to proceed.  The patient was concurrently followed by [facility] and their orthodontist prior to surgery.    The patient presented to [facility] on [facility]/15/23 for the abovementioned procedure.  The patient underwent surgery without any complications under general anesthesia. The patient remained stable throught the intraoperative periode and was extubated in the operating room without complications. The patient was admitted postoperatively for routine postoperative observation, pain management, post operative bleeding monitoring and IV antibiotics.  On postop day #1, the patient was found to be recovering well from surgery.  The patient was lying comfortable in bed, alert and oriented.  The patient's pain was controlled overnight.  The patient was able to take adequate PO  liquid diet without nausea or vomiting, and was able void and ambulate without difficulty.   All vital signs remained stable throughout admission.  Given patient's progress, adequate pain control and ability to take PO and void, it was determine that the patient could be discharged to home on POD #1  under the  care of self/family.     DISCHARGE EXAMINATION:   GENERAL:  Patient was alert and oriented. Comfortable.  Answered questions appropriately.  Able to voice concerns appropriately.  Pleasant and cooperative.  Reported that pain was well controlled overnight at 2/10.    NEUROLOGIC:  Cranial nerve II through XII are grossly intact with bilateral cranial nerve V2 distribution paresthesia as expected from surgery.  The patient was alert and oriented x3, speaking in full sentences.  Denied any radiating pain or numbness.   HEENT:  Normocephalic, atraumatic.  The patient had facial and lip swelling consistent with surgical manipulation.  The patient reported bilateral cranial nerve V2 nerve distribution paresthesia as expected from surgery. Patient eyes: PERRL with EOMI. The patient denied diplopia or blurry vision.  Ears: external ears atraumatic with gross auditory function intact. Nose and bilateral maxillary sinuses with congestion consistent with surgery.  No epistaxis. Dried blood present.  Intraorally, the surgical incision sites were closed with sutures intact, the mucosa  pink with good perfusion, and blanching to palpation with brisk reprofusion. Patient occlusion was stable and reproducible with inter arch elastics in place. Maxillary splint secured and patient occluding into splint.  Floor of mouth was soft, nontender and nondistended.  Tongue was soft and freely mobile.  No dysphagia.  Trachea midline.  There is no cervical lymphadenopathy.   LUNGS:  Clear to auscultation bilaterally.  No crackles, no wheezing, no coursing.    CARDIOVASCULAR:  regular rate and rhythm  ABDOMEN:  Normal bowel sounds present, soft, nondistended, nontender.    MUSCULOSKELETAL:  There is 5/5 strength in all 4 extremities.   SKIN:  No rashes, no hives.     DISCHARGE MEDICATIONS:   Previously given at out patient visit    DISCHARGE INSTRUCTIONS:   1.  Home oral cares:  Patient to brush teeth normally, rinse mouth with Peridex mouth  rinse 2 times daily.  Also oral rinse with warm salt water 3-5 times daily.    2.  May shower and wash face normally the day after surgery  3.  The patient to have strict, soft, nonchew diet: food that can be mashed with fingers, for example mashed potatoes, pudding, Jell-O, applesauce, yogurt.    4.  Nasal precautions:  The patient was instructed not to blow or sneeze, and to sneeze with mouth open if necessary for two weeks.  No sucking or drinking with straw.    5. To sleep with head of bed elevated the next 72 hours, instructed to used pillowed to elevated head to help with swelling.    6.  The patient can use jaw bra and ice in the next 72 hours to aid with swelling and pain, can use 10 minutes on and 10 minutes off while awake.     7. The patient was instructed not to lift any weight greater than 15-20 pounds for 2 weeks.    8.  No activities with face contact for the next 6 weeks.    9.  No driving when taking narcotic pain medication.   10. Call [facility] and ask for Oral Surgeon on call if there are any questions or concerns, or if the patient experiences increasing pain, swelling, bleeding or temperature/fever.     FOLLOWUP:  The patient was instructed to to return for postop followup appointment with Dr. Audi Baca at the [facility] in 1 week.      Pedro Fonseca DDS, MD  [facility]  [address]  [place] [address]  Clinic/On call [phone]  Clinic Fax [phone]

## 2023-05-08 ENCOUNTER — HEALTH MAINTENANCE LETTER (OUTPATIENT)
Age: 53
End: 2023-05-08

## 2023-12-17 ENCOUNTER — HEALTH MAINTENANCE LETTER (OUTPATIENT)
Age: 53
End: 2023-12-17

## 2025-01-12 ENCOUNTER — HEALTH MAINTENANCE LETTER (OUTPATIENT)
Age: 55
End: 2025-01-12

## 2025-05-17 ENCOUNTER — HEALTH MAINTENANCE LETTER (OUTPATIENT)
Age: 55
End: 2025-05-17

## (undated) DEVICE — SPONGE PACK VAGINAL 2X36"

## (undated) DEVICE — BLADE SAW RECIPROCATING LINVATEC 5053-054

## (undated) DEVICE — VSP ORTHOGNATHIC BUNDLE VSPORTHOG

## (undated) DEVICE — LINEN TOWEL PACK X5 5464

## (undated) DEVICE — SURGICEL HEMOSTAT 3X4" NUKNIT 1943

## (undated) DEVICE — BLADE SAW MICRO SAGITTAL LINVATEC 9.5X41X0.4MM 5023-140

## (undated) DEVICE — SU VICRYL 4-0 PC-3 18" UND J845G

## (undated) DEVICE — BUR ROUND 2MM CARBIDE LONG 5092-224

## (undated) DEVICE — ESU GROUND PAD UNIVERSAL W/O CORD

## (undated) DEVICE — BLADE SAW RECIPROCATING LORENTZ 50-5354

## (undated) DEVICE — DRILL TWIST STRK 1.35X50MM 5MM STOP 6013505

## (undated) DEVICE — BUR OVAL LINVATEC 7X70MM CARBIDE 5092-236

## (undated) DEVICE — ESU NDL COLORADO MICRO 3CM STR N103A

## (undated) DEVICE — SOL NACL 0.9% IRRIG 1000ML BOTTLE 2F7124

## (undated) DEVICE — ESU PENCIL W/HOLSTER E2350H

## (undated) DEVICE — SOL WATER IRRIG 1000ML BOTTLE 2F7114

## (undated) DEVICE — BUR WIREPASS 1X19MM 5091-247

## (undated) DEVICE — BLADE SAW SAGITTAL 25.5X9.5X.4MM FINE LINVATEC 5023-138

## (undated) DEVICE — PACK HEAD NECK SEN15HNFSF

## (undated) RX ORDER — HYDROMORPHONE HCL IN WATER/PF 6 MG/30 ML
PATIENT CONTROLLED ANALGESIA SYRINGE INTRAVENOUS
Status: DISPENSED
Start: 2023-03-15

## (undated) RX ORDER — CHLORHEXIDINE GLUCONATE ORAL RINSE 1.2 MG/ML
SOLUTION DENTAL
Status: DISPENSED
Start: 2023-03-15

## (undated) RX ORDER — BENZOCAINE/MENTHOL 6 MG-10 MG
LOZENGE MUCOUS MEMBRANE
Status: DISPENSED
Start: 2023-03-15

## (undated) RX ORDER — PROPOFOL 10 MG/ML
INJECTION, EMULSION INTRAVENOUS
Status: DISPENSED
Start: 2023-03-15

## (undated) RX ORDER — METHYLPREDNISOLONE SODIUM SUCCINATE 125 MG/2ML
INJECTION, POWDER, LYOPHILIZED, FOR SOLUTION INTRAMUSCULAR; INTRAVENOUS
Status: DISPENSED
Start: 2023-03-15

## (undated) RX ORDER — FENTANYL CITRATE 50 UG/ML
INJECTION, SOLUTION INTRAMUSCULAR; INTRAVENOUS
Status: DISPENSED
Start: 2023-03-15

## (undated) RX ORDER — DEXAMETHASONE SODIUM PHOSPHATE 4 MG/ML
INJECTION, SOLUTION INTRA-ARTICULAR; INTRALESIONAL; INTRAMUSCULAR; INTRAVENOUS; SOFT TISSUE
Status: DISPENSED
Start: 2023-03-15

## (undated) RX ORDER — ONDANSETRON 2 MG/ML
INJECTION INTRAMUSCULAR; INTRAVENOUS
Status: DISPENSED
Start: 2023-03-15

## (undated) RX ORDER — REMIFENTANIL HYDROCHLORIDE 1 MG/ML
INJECTION, POWDER, LYOPHILIZED, FOR SOLUTION INTRAVENOUS
Status: DISPENSED
Start: 2023-03-15

## (undated) RX ORDER — LABETALOL HYDROCHLORIDE 5 MG/ML
INJECTION, SOLUTION INTRAVENOUS
Status: DISPENSED
Start: 2023-03-15

## (undated) RX ORDER — CEFAZOLIN SODIUM 1 G/3ML
INJECTION, POWDER, FOR SOLUTION INTRAMUSCULAR; INTRAVENOUS
Status: DISPENSED
Start: 2023-03-15

## (undated) RX ORDER — HYDROMORPHONE HYDROCHLORIDE 1 MG/ML
INJECTION, SOLUTION INTRAMUSCULAR; INTRAVENOUS; SUBCUTANEOUS
Status: DISPENSED
Start: 2023-03-15

## (undated) RX ORDER — LIDOCAINE HCL/EPINEPHRINE/PF 2%-1:200K
VIAL (ML) INJECTION
Status: DISPENSED
Start: 2023-03-15

## (undated) RX ORDER — OXYMETAZOLINE HYDROCHLORIDE 0.05 G/100ML
SPRAY NASAL
Status: DISPENSED
Start: 2023-03-15

## (undated) RX ORDER — FENTANYL CITRATE 0.05 MG/ML
INJECTION, SOLUTION INTRAMUSCULAR; INTRAVENOUS
Status: DISPENSED
Start: 2023-03-15

## (undated) RX ORDER — CEFAZOLIN SODIUM/WATER 2 G/20 ML
SYRINGE (ML) INTRAVENOUS
Status: DISPENSED
Start: 2023-03-15